# Patient Record
Sex: MALE | Race: WHITE | URBAN - METROPOLITAN AREA
[De-identification: names, ages, dates, MRNs, and addresses within clinical notes are randomized per-mention and may not be internally consistent; named-entity substitution may affect disease eponyms.]

---

## 2022-01-01 ENCOUNTER — OUT OF OFFICE VISIT (OUTPATIENT)
Dept: URBAN - METROPOLITAN AREA MEDICAL CENTER 9 | Facility: MEDICAL CENTER | Age: 72
End: 2022-01-01
Payer: MEDICARE

## 2022-01-01 ENCOUNTER — WEB ENCOUNTER (OUTPATIENT)
Dept: URBAN - METROPOLITAN AREA CLINIC 40 | Facility: CLINIC | Age: 72
End: 2022-01-01

## 2022-01-01 ENCOUNTER — DASHBOARD ENCOUNTERS (OUTPATIENT)
Age: 72
End: 2022-01-01

## 2022-01-01 ENCOUNTER — OFFICE VISIT (OUTPATIENT)
Dept: URBAN - METROPOLITAN AREA CLINIC 40 | Facility: CLINIC | Age: 72
End: 2022-01-01
Payer: MEDICARE

## 2022-01-01 VITALS
WEIGHT: 216 LBS | DIASTOLIC BLOOD PRESSURE: 60 MMHG | TEMPERATURE: 98.1 F | HEART RATE: 121 BPM | BODY MASS INDEX: 35.99 KG/M2 | HEIGHT: 65 IN | SYSTOLIC BLOOD PRESSURE: 108 MMHG

## 2022-01-01 DIAGNOSIS — D64.89 ANEMIA DUE TO OTHER CAUSE: ICD-10-CM

## 2022-01-01 DIAGNOSIS — K26.9 DUODENAL ULCER: ICD-10-CM

## 2022-01-01 DIAGNOSIS — K25.9 ANTRAL ULCER: ICD-10-CM

## 2022-01-01 DIAGNOSIS — D50.8 OTHER IRON DEFICIENCY ANEMIA: ICD-10-CM

## 2022-01-01 DIAGNOSIS — Z79.02 ANTIPLATELET OR ANTITHROMBOTIC LONG-TERM USE: ICD-10-CM

## 2022-01-01 DIAGNOSIS — K92.1 ACUTE MELENA: ICD-10-CM

## 2022-01-01 DIAGNOSIS — Z92.29 HX OF LONG TERM USE OF BLOOD THINNERS: ICD-10-CM

## 2022-01-01 DIAGNOSIS — K29.60 ADENOPAPILLOMATOSIS GASTRICA: ICD-10-CM

## 2022-01-01 DIAGNOSIS — K92.2 ACUTE GASTROINTESTINAL BLEEDING: ICD-10-CM

## 2022-01-01 PROCEDURE — 43239 EGD BIOPSY SINGLE/MULTIPLE: CPT | Performed by: INTERNAL MEDICINE

## 2022-01-01 PROCEDURE — 99232 SBSQ HOSP IP/OBS MODERATE 35: CPT | Performed by: INTERNAL MEDICINE

## 2022-01-01 PROCEDURE — 99214 OFFICE O/P EST MOD 30 MIN: CPT | Performed by: PHYSICIAN ASSISTANT

## 2022-01-01 PROCEDURE — G8427 DOCREV CUR MEDS BY ELIG CLIN: HCPCS | Performed by: INTERNAL MEDICINE

## 2022-01-01 PROCEDURE — 99222 1ST HOSP IP/OBS MODERATE 55: CPT | Performed by: INTERNAL MEDICINE

## 2022-01-01 PROCEDURE — 99232 SBSQ HOSP IP/OBS MODERATE 35: CPT | Performed by: PHYSICIAN ASSISTANT

## 2022-01-01 RX ORDER — PANTOPRAZOLE SODIUM 40 MG/1
1 TABLET TABLET, DELAYED RELEASE ORAL ONCE A DAY
Qty: 90 TABLET | Refills: 1

## 2022-01-01 RX ORDER — HYDROXYZINE HYDROCHLORIDE 10 MG/1
AS DIRECTED TABLET, FILM COATED ORAL
Status: ACTIVE | COMMUNITY

## 2022-01-01 RX ORDER — TORSEMIDE 20 MG/1
AS DIRECTED TABLET ORAL
Status: ACTIVE | COMMUNITY

## 2022-01-01 RX ORDER — FORMOTEROL FUMARATE DIHYDRATE 20 UG/2ML
2 ML SOLUTION RESPIRATORY (INHALATION) TWICE A DAY
Status: ACTIVE | COMMUNITY

## 2022-01-01 RX ORDER — PANTOPRAZOLE SODIUM 40 MG/1
1 TABLET TABLET, DELAYED RELEASE ORAL ONCE A DAY
Status: ACTIVE | COMMUNITY

## 2022-01-01 RX ORDER — TAMSULOSIN HYDROCHLORIDE 0.4 MG/1
1 CAPSULE CAPSULE ORAL ONCE A DAY
Status: ACTIVE | COMMUNITY

## 2022-01-01 RX ORDER — CLOPIDOGREL BISULFATE 75 MG/1
1 TABLET TABLET ORAL ONCE A DAY
Status: ACTIVE | COMMUNITY

## 2022-01-01 RX ORDER — REVEFENACIN 175 UG/3ML
3 ML SOLUTION RESPIRATORY (INHALATION) ONCE A DAY
Status: ACTIVE | COMMUNITY

## 2022-01-01 RX ORDER — TRAZODONE HYDROCHLORIDE 50 MG/1
1 TABLET AT BEDTIME AS NEEDED TABLET ORAL ONCE A DAY
Status: ACTIVE | COMMUNITY

## 2022-01-01 RX ORDER — ATORVASTATIN CALCIUM 40 MG/1
1 TABLET TABLET, FILM COATED ORAL ONCE A DAY
Status: ACTIVE | COMMUNITY

## 2022-01-01 RX ORDER — FINASTERIDE 5 MG/1
1 TABLET TABLET, FILM COATED ORAL ONCE A DAY
Status: ACTIVE | COMMUNITY

## 2022-01-01 RX ORDER — CARVEDILOL 3.12 MG/1
1 TABLET WITH FOOD TABLET, FILM COATED ORAL TWICE A DAY
Status: ACTIVE | COMMUNITY

## 2022-08-17 NOTE — HPI-TODAY'S VISIT:
Mr. Covarrubias is a 72 year old male who presents to clinic for f/u, recent [facility] admission on 6/27/2022 for symptomatic anemia. 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 Immunotherapy held and last treatment was 05/11/2022, S/P Bronchoscopy in 06/16/2022 with no clear evidence of progression, History of vocal cord dysfunction with stridor, Acute on Chronic Hypercapnic and Hypoxic Respiratory Failure, Small Bilateral Pleural Effusions, COPD, BRENDA noncompliant with Trilogy, PE/DVT , CAD, S/P Cardiac stent, PVC's/NSVT, Chronic HFpEF, Tobacco abuse, BPH, HTN, HLP, DM, Personal history of colon polyps, Internal Hemorrhoids, External Hemorrhoids, Anemia of chronic disease, Fe deficiency anemia, Obesity, and OA presented to ED at  with weakness and SOB. The patient with recent admission and discharged to [facility] is returning to  with weakness and SOB. He does not complaint of overt GI blood loss. He follows with Dr. [facility] and per last note 06/17/2022-"Doubt he will be a treatment candidate. The patient was unrealistic about his overall prognosis despite long discussion. Currently on 3-4 liter of Oxygen at home. He had heme positive stool and dark stool in ED. Hgb last admission on 06/17 10.1 and today Hgb 6.2 s/p 6U PRBCs during inpatient stay.He has known history of Tobacco abuse, remote history of ETOH abuse, and no history of Illicit drug abuse. The patient is up to date with Flu, Pneumonia, and COVID vaccine 2/2.      Procedure: Colonoscopy on 05/18/2010 by Dr. Hope noted one 4 mm polyp the transverse colon, one 6 mm polyp in the sigmoid colon, and one 3 mm polyp in the rectum. Resected and retrieved. Non-bleeding internal hemorrhoids. Non-thrombosed external hemorrhoids. Mucosa ulceration isolated in the ascending colon. Biopsy with tubular and hyperplastic colon polyp. No abnormal colonic mucosa. No colitis e.g. microscopic or lymphocytic colitis.       EGD with Dr. Mandujano on 06/28/2022: A small hiatal hernia was present.  Mild inflammation characterized by erythema was found in the gastric antrum.  Biopsies were taken with a cold forceps for histology. Few non-bleeding superficial duodenal ulcers with no stigmata of bleeding were found in the second portion of the duodenum at the sweep.  The largest lesion was 9 mm in largest dimension.  For hemostasis, hemostatic spray was deployed.  Multiple sprays were applied.  There was no bleeding at the end of the procedure. Per path, gastritis, no IM or Hpylori. He is doing well, following procedures on ppi and sucralfate. No abdominal pain, N/V or trouble swallowing. Appetite preserved.

## 2022-08-18 PROBLEM — 931000119107 DEPENDENCE ON SUPPLEMENTAL OXYGEN: Status: ACTIVE | Noted: 2022-01-01

## 2022-08-18 PROBLEM — 196731005 GASTRITIS AND DUODENITIS: Status: ACTIVE | Noted: 2022-01-01

## 2022-08-18 PROBLEM — 415082004 PERSONAL HISTORY OF PRIMARY MALIGNANT NEOPLASM OF LUNG: Status: ACTIVE | Noted: 2022-01-01

## 2022-08-18 PROBLEM — 87522002 IRON DEFICIENCY ANEMIA: Status: ACTIVE | Noted: 2022-01-01

## 2022-08-24 PROBLEM — 87522002 IRON DEFICIENCY ANEMIA: Status: ACTIVE | Noted: 2022-01-01

## 2022-08-24 PROBLEM — 51868009 DUODENAL ULCER: Status: ACTIVE | Noted: 2022-01-01

## 2022-11-17 ENCOUNTER — OFFICE VISIT (OUTPATIENT)
Dept: URBAN - METROPOLITAN AREA CLINIC 40 | Facility: CLINIC | Age: 72
End: 2022-11-17